# Patient Record
Sex: MALE | Race: BLACK OR AFRICAN AMERICAN | NOT HISPANIC OR LATINO | URBAN - METROPOLITAN AREA
[De-identification: names, ages, dates, MRNs, and addresses within clinical notes are randomized per-mention and may not be internally consistent; named-entity substitution may affect disease eponyms.]

---

## 2023-07-17 ENCOUNTER — OFFICE (OUTPATIENT)
Dept: URBAN - METROPOLITAN AREA CLINIC 19 | Facility: CLINIC | Age: 33
End: 2023-07-17
Payer: OTHER GOVERNMENT

## 2023-07-17 VITALS
SYSTOLIC BLOOD PRESSURE: 132 MMHG | HEIGHT: 72 IN | WEIGHT: 254 LBS | OXYGEN SATURATION: 97 % | HEART RATE: 87 BPM | DIASTOLIC BLOOD PRESSURE: 81 MMHG

## 2023-07-17 DIAGNOSIS — R19.4 CHANGE IN BOWEL HABIT: ICD-10-CM

## 2023-07-17 DIAGNOSIS — K92.1 MELENA: ICD-10-CM

## 2023-07-17 PROCEDURE — 99204 OFFICE O/P NEW MOD 45 MIN: CPT

## 2023-07-17 RX ORDER — SODIUM PICOSULFATE, MAGNESIUM OXIDE, AND ANHYDROUS CITRIC ACID 10; 3.5; 12 MG/160ML; G/160ML; G/160ML
LIQUID ORAL
Qty: 350 | Refills: 0 | Status: ACTIVE
Start: 2023-07-17

## 2023-07-17 NOTE — SERVICENOTES
The patient's assessment was reviewed with Dr. Girard and a collaborative plan of care was established.

## 2023-07-17 NOTE — SERVICEHPINOTES
33-year-old single black male here for follow-up after [facility] visit [facility] ago.  He had an episode of hematochezia along with a day or 2 of diarrhea and blood in his urine.  [facility] went [facility] as his symptoms were concerning where routine blood work and CT scan were unremarkable.  He had a rectal exam which was also unremarkable and he was encouraged to follow-up with GI to have a colonoscopy.  He has not had any subsequent episodes of blood in the stool or rectal bleeding.  His bowel movements are back to normal and usually are pretty regular.  He denies any significant history of constipation or diarrhea.  He denies abdominal pain, abnormal weight loss, nausea, vomiting, reflux or dysphagia.  Family history is negative for IBD.  He has an uncle that had colon cancer at age 62.